# Patient Record
Sex: MALE | Race: OTHER | NOT HISPANIC OR LATINO
[De-identification: names, ages, dates, MRNs, and addresses within clinical notes are randomized per-mention and may not be internally consistent; named-entity substitution may affect disease eponyms.]

---

## 2020-12-16 ENCOUNTER — TRANSCRIPTION ENCOUNTER (OUTPATIENT)
Age: 60
End: 2020-12-16

## 2022-07-19 ENCOUNTER — APPOINTMENT (OUTPATIENT)
Dept: PAIN MANAGEMENT | Facility: CLINIC | Age: 62
End: 2022-07-19

## 2022-07-19 VITALS
HEIGHT: 70 IN | HEART RATE: 78 BPM | WEIGHT: 145 LBS | BODY MASS INDEX: 20.76 KG/M2 | DIASTOLIC BLOOD PRESSURE: 74 MMHG | SYSTOLIC BLOOD PRESSURE: 128 MMHG

## 2022-07-19 DIAGNOSIS — Z78.9 OTHER SPECIFIED HEALTH STATUS: ICD-10-CM

## 2022-07-19 DIAGNOSIS — Z80.9 FAMILY HISTORY OF MALIGNANT NEOPLASM, UNSPECIFIED: ICD-10-CM

## 2022-07-19 DIAGNOSIS — Z82.3 FAMILY HISTORY OF STROKE: ICD-10-CM

## 2022-07-19 DIAGNOSIS — Z96.9 PRESENCE OF FUNCTIONAL IMPLANT, UNSPECIFIED: ICD-10-CM

## 2022-07-19 PROBLEM — Z00.00 ENCOUNTER FOR PREVENTIVE HEALTH EXAMINATION: Status: ACTIVE | Noted: 2022-07-19

## 2022-07-19 LAB
AMPHET UR-MCNC: NORMAL
BARBITURATES UR-MCNC: NORMAL
BENZODIAZ UR-MCNC: NORMAL
COCAINE METAB.OTHER UR-MCNC: NORMAL
DRUG SCREEN, URINE ROUTINE: NORMAL
METHADONE UR-MCNC: NORMAL
METHAQUALONE UR-MCNC: NORMAL
OPIATES UR-MCNC: NORMAL
PCP UR-MCNC: NORMAL
PROPOXYPH UR QL: NORMAL
THC UR QL: POSITIVE

## 2022-07-19 PROCEDURE — 80305 DRUG TEST PRSMV DIR OPT OBS: CPT | Mod: QW

## 2022-07-19 PROCEDURE — 99214 OFFICE O/P EST MOD 30 MIN: CPT

## 2022-07-19 NOTE — DISCUSSION/SUMMARY
[de-identified] : The patient is stable on current pain medication with analgesia and without notable side effects or any obvious aberrant behaviors exhibited. Will renew medication today.\par \par Urine drug screening collected today with rapid sample result consistent with given regimen. Sample to be sent for confirmatory testing with additional relief at a later time.\par \par MMRx high THC low CBD up to QID dosing, prn pain\par \par I personally reviewed with the PA, this patient's history and physical exam findings, as documented above. I have discussed the relevant areas of concern, having direct implications to the presenting problems and illnesses, and I have personally examined all pertinent and positive and negative findings, which impact on the prior pain management treatment. \par \par \par NEO Bustillo, \par \par

## 2022-07-19 NOTE — HISTORY OF PRESENT ILLNESS
[FreeTextEntry1] : A continuing active encounter took place, previous history and exam reviewed.\par \par HISTORY OF PRESENT ILLNESS: Mr. Mcghee is a 60 year old male complaining of chronic lower back pain. He reports he was a . The pain started after lifting weights. The patient has had this pain for 4 years. Patient describes the pain as moderate-severe. During the last month the pain has been nearly constant with symptoms worsening in the afternoon, evening. Pain described as burning, pressure-like, shooting. Pain is associated with pins and needles. Pain is increased with standing. Pain is decreased with lying down, sitting, walking, exercise, relaxation. Pain is not changed with coughing sneezing or bowel movements. Bowel or bladder habits have not changed.\par \par ACTIVITIES: Patient could walk 100 blocks before the pain starts. Patient can sit 2-3 hours before pain starts. Patient can stand 2-3 hours before pain starts. The patient stones on lies down because of pain. Patient uses no assistive walking device at this time. \par \par PRIOR PAIN TREATMENTS: Moderate relief with physical therapy, exercise, spinal cord stimulator\par \par Prior Pain Medications: Hydrocodone, codeine, oxycontin, Tylenol, aspirin, motrin\par \par TODAY: he returns for a revisit encounter. Medical marijuana continues for pain control efforts. The given regimen allows moderate relief with regards to his low back pain and he notes reduced numbness, tingling, burning features emanating from the low back and into the legs. He remains largely content with his reduced pain at this time and he wishes to continue without change.

## 2022-07-19 NOTE — ASSESSMENT
[FreeTextEntry1] : 60yo M who continues with the use of medical marijuana for pain control efforts; relief remains largely stable and he wishes to continue without change at this time\par \par 30 min encounter.

## 2022-07-19 NOTE — REVIEW OF SYSTEMS
[Arthralgia] : arthralgia [Chills] : no chills [Discharge] : no discharge [Nasal Discharge] : no nasal discharge [Cough] : no cough [Constipation] : no constipation [Urinary Urgency] : no urinary urgency [Breast Lump] : no breast lump

## 2022-11-07 ENCOUNTER — NON-APPOINTMENT (OUTPATIENT)
Age: 62
End: 2022-11-07

## 2022-11-23 ENCOUNTER — APPOINTMENT (OUTPATIENT)
Dept: PAIN MANAGEMENT | Facility: CLINIC | Age: 62
End: 2022-11-23

## 2023-02-28 ENCOUNTER — LABORATORY RESULT (OUTPATIENT)
Age: 63
End: 2023-02-28

## 2023-03-01 ENCOUNTER — APPOINTMENT (OUTPATIENT)
Dept: PAIN MANAGEMENT | Facility: CLINIC | Age: 63
End: 2023-03-01
Payer: COMMERCIAL

## 2023-03-01 VITALS — HEIGHT: 70 IN | BODY MASS INDEX: 20.76 KG/M2 | WEIGHT: 145 LBS

## 2023-03-01 LAB
AMP / AMPHETAMINE: NEGATIVE
BAR / SECOBARBITAL: NEGATIVE
BZO / OXAZEPAM: NEGATIVE
CREATININE: NORMAL MG/DL
MDMA / METHYLENEDIOXYMETHAMPHETAMINE: NEGATIVE
MET / METHAMPHETAMINES: NEGATIVE
MOP / MORPHINE: NEGATIVE
MTD / METHADONE: NEGATIVE
OXY / OXYCODONE: NEGATIVE
PCP / PHENCYCLIDINE: NEGATIVE
PH: NORMAL
SPECIFIC GRAVITY: NORMAL
TEMPERATURE: NORMAL C
THC / MARIJUANA: POSITIVE

## 2023-03-01 PROCEDURE — 80305 DRUG TEST PRSMV DIR OPT OBS: CPT | Mod: QW

## 2023-03-01 PROCEDURE — 99214 OFFICE O/P EST MOD 30 MIN: CPT

## 2023-03-01 NOTE — HISTORY OF PRESENT ILLNESS
[FreeTextEntry1] : A continuing active encounter took place, previous history and exam reviewed.\par \par HISTORY OF PRESENT ILLNESS: Mr. Mcghee is a 62 year old male complaining of chronic lower back pain. He reports he was a . The pain started after lifting weights. The patient has had this pain for 4 years. Patient describes the pain as moderate-severe. During the last month the pain has been nearly constant with symptoms worsening in the afternoon, evening. Pain described as burning, pressure-like, shooting. Pain is associated with pins and needles. Pain is increased with standing. Pain is decreased with lying down, sitting, walking, exercise, relaxation. Pain is not changed with coughing sneezing or bowel movements. Bowel or bladder habits have not changed.\par \par ACTIVITIES: Patient could walk 100 blocks before the pain starts. Patient can sit 2-3 hours before pain starts. Patient can stand 2-3 hours before pain starts. The patient stones on lies down because of pain. Patient uses no assistive walking device at this time. \par \par PRIOR PAIN TREATMENTS: Moderate relief with physical therapy, exercise, spinal cord stimulator\par \par Prior Pain Medications: Hydrocodone, codeine, oxycontin, Tylenol, aspirin, motrin\par \par TODAY: He returns for a revisit encounter. Medical marijuana continues for pain control efforts. The given regimen allows moderate relief with regards to his low back pain and he notes reduced numbness, tingling, burning features emanating from the low back and into the legs. He remains largely content with his reduced pain at this time and he wishes to continue without change. Of note, patient participates in yoga 3-4 times a week.\par \par UDS, repeated today, 3/1/23 - see separate note.

## 2023-03-01 NOTE — PHYSICAL EXAM
[de-identified] : Back, including spine: Palpation of the thoracic/lumbar spine is as follows: bilateral lumbar paraspinal spasm and bilateral lumbar paraspinal tenderness. Range of motion of the thoracic and lumbar spine is as follows: Diminished range of motion in all planes. Special testing of the thoracic and lumbar spine is as follows: Positive jung maneuver/facet loading bilaterally.

## 2023-03-01 NOTE — ASSESSMENT
[FreeTextEntry1] : 63yo M who continues with the use of medical marijuana for pain control efforts; relief remains largely stable and he wishes to continue without change at this time. \par MMRx high THC low CBD up to QID dosing, prn pain\par \par

## 2023-03-01 NOTE — REASON FOR VISIT
[Follow-Up Visit] : a follow-up pain management visit [FreeTextEntry2] : Patient presents to office today for a follow up on back pain

## 2023-03-01 NOTE — DISCUSSION/SUMMARY
[de-identified] : The patient is stable on current pain medication with analgesia and without notable side effects or any obvious aberrant behaviors exhibited. Will renew MM today.\par \par Urine drug screening collected today with rapid sample result consistent with given regimen. Sample to be sent for confirmatory testing with additional relief at a later time.\par \par The patient will return to the office in 4 weeks time and is aware to contact me with any question or concerns in the interim.\par \par Amy Mancera PA-C\par Mario Ward DO\par \par \par \par

## 2023-03-06 LAB
PM 6 MAM: NEGATIVE NG/ML
PM 7-AMINO-CLONAZ: NEGATIVE NG/ML
PM ALPHA-HYDROXY-ALPRAZOLAM: NEGATIVE NG/ML
PM ALPHA-HYDROXY-MIDAZOLAM: NEGATIVE NG/ML
PM ALPRAZOLAM: NEGATIVE NG/ML
PM AMOBARBITAL: NEGATIVE NG/ML
PM AMPHETAMINE INTERP: NEGATIVE
PM AMPHETAMINE: NEGATIVE NG/ML
PM BARBURATES INTERP: NEGATIVE
PM BEG: NEGATIVE NG/ML
PM BENZODIAZEPINES INTERP: NEGATIVE
PM BUPRENORPHINE INTERP: NEGATIVE
PM BUPRENORPHINE: NEGATIVE NG/ML
PM BUTALBITAL: NEGATIVE NG/ML
PM CLONAZEPAM: NEGATIVE NG/ML
PM COCAINE INTERP: NEGATIVE
PM COCAINE: NEGATIVE NG/ML
PM CODIENE: NEGATIVE NG/ML
PM COTININE: 300 NG/ML
PM DIAZEPAM: NEGATIVE NG/ML
PM DIHYROCODEINE: NEGATIVE NG/ML
PM EDDP: NEGATIVE NG/ML
PM FENTANYL INTERP: NEGATIVE
PM FENTANYL: NEGATIVE NG/ML
PM FLUNITRAZEPAM: NEGATIVE NG/ML
PM FLURAZEPAM: NEGATIVE NG/ML
PM HYDROCODONE: NEGATIVE NG/ML
PM HYDROMORPHONE: NEGATIVE NG/ML
PM LORAZEPAM: NEGATIVE NG/ML
PM MARIJUANA (DELTA-9-THC): 2429 NG/ML
PM MARIJUANA INTERP: POSITIVE
PM MDA: NEGATIVE NG/ML
PM MDEA: NEGATIVE NG/ML
PM MDMA: NEGATIVE NG/ML
PM MEPERIDINE: NEGATIVE NG/ML
PM METHADONE INTERP: NEGATIVE
PM METHADONE: NEGATIVE NG/ML
PM METHAMPHETAMINE: NEGATIVE NG/ML
PM MIDAZOLAM: NEGATIVE NG/ML
PM MORPHINE: NEGATIVE NG/ML
PM NALOXONE: NEGATIVE NG/ML
PM NALTREXONE: NEGATIVE NG/ML
PM NICOTINE INTERP: POSITIVE
PM NORBUPRENORPHINE: NEGATIVE NG/ML
PM NORDIAZEPAM: NEGATIVE NG/ML
PM NORMEPERIDINE: NEGATIVE NG/ML
PM NOROXYCODONE: NEGATIVE NG/ML
PM OPIOID INTERP: NEGATIVE
PM OXAZEPAM: NEGATIVE NG/ML
PM OXXYCODONE INTERP: NEGATIVE
PM OXYCODONE: NEGATIVE NG/ML
PM OXYMORPHONE: NEGATIVE NG/ML
PM PCP: NEGATIVE NG/ML
PM PHENCYCLIDINE INTERP: NEGATIVE
PM PHENOBARBITAL: NEGATIVE NG/ML
PM PPX: NEGATIVE NG/ML
PM PROPOXYPHENE INTERP: NEGATIVE
PM SECOBARBITAL: NEGATIVE NG/ML
PM SUFENTANIL: NEGATIVE NG/ML
PM TAPENTADOL: NEGATIVE NG/ML
PM TEMAZEPAM: NEGATIVE NG/ML
PM TRAMADOL INTERP: NEGATIVE
PM TRAMADOL: NEGATIVE NG/ML

## 2023-07-07 ENCOUNTER — LABORATORY RESULT (OUTPATIENT)
Age: 63
End: 2023-07-07

## 2023-07-07 ENCOUNTER — APPOINTMENT (OUTPATIENT)
Dept: PAIN MANAGEMENT | Facility: CLINIC | Age: 63
End: 2023-07-07
Payer: COMMERCIAL

## 2023-07-07 VITALS — WEIGHT: 145 LBS | BODY MASS INDEX: 20.76 KG/M2 | HEIGHT: 70 IN

## 2023-07-07 LAB
AMP / AMPHETAMINE: NEGATIVE
BAR / SECOBARBITAL: NEGATIVE
BUP / BUPRENORPHINE: NEGATIVE
BZO / OXAZEPAM: NEGATIVE
COC / COCAINE: NEGATIVE
CREATININE: 60 MG/DL
MDMA / METHYLENEDIOXYMETHAMPHETAMINE: NEGATIVE
MET / METHAMPHETAMINES: NEGATIVE
MOP / MORPHINE: NEGATIVE
MTD / METHADONE: NEGATIVE
OXY / OXYCODONE: NEGATIVE
PCP / PHENCYCLIDINE: NEGATIVE
PH: 5
SPECIFIC GRAVITY: 1.01
TEMPERATURE: 90 C
THC / MARIJUANA: POSITIVE

## 2023-07-07 PROCEDURE — 80305 DRUG TEST PRSMV DIR OPT OBS: CPT | Mod: QW

## 2023-07-07 PROCEDURE — 99213 OFFICE O/P EST LOW 20 MIN: CPT

## 2023-07-07 NOTE — PHYSICAL EXAM
[de-identified] : Back, including spine: Palpation of the thoracic/lumbar spine is as follows: bilateral lumbar paraspinal spasm and bilateral lumbar paraspinal tenderness. Range of motion of the thoracic and lumbar spine is as follows: Diminished range of motion in all planes. Special testing of the thoracic and lumbar spine is as follows: Positive jung maneuver/facet loading bilaterally.

## 2023-07-07 NOTE — DISCUSSION/SUMMARY
[de-identified] : The patient is stable on current pain medication with analgesia and without notable side effects or any obvious aberrant behaviors exhibited.\par Urine drug screening collected today with rapid sample result consistent with given regimen. Sample to be sent for confirmatory testing with additional relief at a later time.\par \par The patient will return to the office in 4 months' time and is aware to contact me with any question or concerns in the interim.\par \par Amy Mancera PA-C\par Mario Ward DO\par \par \par \par

## 2023-07-07 NOTE — HISTORY OF PRESENT ILLNESS
[FreeTextEntry1] : A continuing active encounter took place, previous history and exam reviewed.\par \par HISTORY OF PRESENT ILLNESS: Mr. Mcghee is a 62 year old male complaining of chronic lower back pain. He reports he was a . The pain started after lifting weights. The patient has had this pain for 4 years. Patient describes the pain as moderate-severe. During the last month the pain has been nearly constant with symptoms worsening in the afternoon, evening. Pain described as burning, pressure-like, shooting. Pain is associated with pins and needles. Pain is increased with standing. Pain is decreased with lying down, sitting, walking, exercise, relaxation. Pain is not changed with coughing sneezing or bowel movements. Bowel or bladder habits have not changed.\par \par ACTIVITIES: Patient could walk 100 blocks before the pain starts. Patient can sit 2-3 hours before pain starts. Patient can stand 2-3 hours before pain starts. The patient stones on lies down because of pain. Patient uses no assistive walking device at this time. \par \par PRIOR PAIN TREATMENTS: Moderate relief with physical therapy, exercise, spinal cord stimulator\par \par Prior Pain Medications: Hydrocodone, codeine, oxycontin, Tylenol, aspirin, motrin\par \par TODAY: He returns for a revisit encounter. Medical marijuana continues for pain control efforts. The given regimen allows moderate relief with regards to his low back pain and he notes reduced numbness, tingling, burning features emanating from the low back and into the legs. He utilizes it 3-4 times a day. He remains largely content with his reduced pain at this time and he wishes to continue without change. Of note, patient participates in yoga 3-4 times a week.\par \par UDS, repeated today, 7/7/23 - see separate note.

## 2023-07-14 LAB
PM 6 MAM: NEGATIVE NG/ML
PM 7-AMINO-CLONAZ: NEGATIVE NG/ML
PM ALPHA-HYDROXY-ALPRAZOLAM: NEGATIVE NG/ML
PM ALPHA-HYDROXY-MIDAZOLAM: NEGATIVE NG/ML
PM ALPRAZOLAM: NEGATIVE NG/ML
PM AMOBARBITAL: NEGATIVE NG/ML
PM AMPHETAMINE INTERP: NEGATIVE
PM AMPHETAMINE: NEGATIVE NG/ML
PM BARBURATES INTERP: NEGATIVE
PM BEG: NEGATIVE NG/ML
PM BENZODIAZEPINES INTERP: NEGATIVE
PM BUPRENORPHINE INTERP: NEGATIVE
PM BUPRENORPHINE: NEGATIVE NG/ML
PM BUTALBITAL: NEGATIVE NG/ML
PM CLONAZEPAM: NEGATIVE NG/ML
PM COCAINE INTERP: NEGATIVE
PM COCAINE: NEGATIVE NG/ML
PM CODIENE: NEGATIVE NG/ML
PM COTININE: 156 NG/ML
PM DIAZEPAM: NEGATIVE NG/ML
PM DIHYROCODEINE: NEGATIVE NG/ML
PM EDDP: NEGATIVE NG/ML
PM FENTANYL INTERP: NEGATIVE
PM FENTANYL: NEGATIVE NG/ML
PM FLUNITRAZEPAM: NEGATIVE NG/ML
PM FLURAZEPAM: NEGATIVE NG/ML
PM HYDROCODONE: NEGATIVE NG/ML
PM HYDROMORPHONE: NEGATIVE NG/ML
PM LORAZEPAM: NEGATIVE NG/ML
PM MARIJUANA (DELTA-9-THC): 822 NG/ML
PM MARIJUANA INTERP: POSITIVE
PM MDA: NEGATIVE NG/ML
PM MDEA: NEGATIVE NG/ML
PM MDMA: NEGATIVE NG/ML
PM MEPERIDINE: NEGATIVE NG/ML
PM METHADONE INTERP: NEGATIVE
PM METHADONE: NEGATIVE NG/ML
PM METHAMPHETAMINE: NEGATIVE NG/ML
PM MIDAZOLAM: NEGATIVE NG/ML
PM MORPHINE: NEGATIVE NG/ML
PM NALOXONE: NEGATIVE NG/ML
PM NALTREXONE: NEGATIVE NG/ML
PM NICOTINE INTERP: POSITIVE
PM NORBUPRENORPHINE: NEGATIVE NG/ML
PM NORDIAZEPAM: NEGATIVE NG/ML
PM NORMEPERIDINE: NEGATIVE NG/ML
PM NOROXYCODONE: NEGATIVE NG/ML
PM OPIOID INTERP: NEGATIVE
PM OXAZEPAM: NEGATIVE NG/ML
PM OXXYCODONE INTERP: NEGATIVE
PM OXYCODONE: NEGATIVE NG/ML
PM OXYMORPHONE: NEGATIVE NG/ML
PM PCP: NEGATIVE NG/ML
PM PHENCYCLIDINE INTERP: NEGATIVE
PM PHENOBARBITAL: NEGATIVE NG/ML
PM PPX: NEGATIVE NG/ML
PM PROPOXYPHENE INTERP: NEGATIVE
PM SECOBARBITAL: NEGATIVE NG/ML
PM SUFENTANIL: NEGATIVE NG/ML
PM TAPENTADOL: NEGATIVE NG/ML
PM TEMAZEPAM: NEGATIVE NG/ML
PM TRAMADOL INTERP: NEGATIVE
PM TRAMADOL: NEGATIVE NG/ML

## 2023-11-24 ENCOUNTER — APPOINTMENT (OUTPATIENT)
Dept: PAIN MANAGEMENT | Facility: CLINIC | Age: 63
End: 2023-11-24
Payer: COMMERCIAL

## 2023-11-24 VITALS
WEIGHT: 145 LBS | HEIGHT: 70 IN | HEART RATE: 49 BPM | BODY MASS INDEX: 20.76 KG/M2 | SYSTOLIC BLOOD PRESSURE: 144 MMHG | DIASTOLIC BLOOD PRESSURE: 84 MMHG

## 2023-11-24 PROCEDURE — 99214 OFFICE O/P EST MOD 30 MIN: CPT

## 2024-01-10 ENCOUNTER — APPOINTMENT (OUTPATIENT)
Dept: PAIN MANAGEMENT | Facility: CLINIC | Age: 64
End: 2024-01-10
Payer: COMMERCIAL

## 2024-01-10 VITALS — HEIGHT: 70 IN | WEIGHT: 145 LBS | BODY MASS INDEX: 20.76 KG/M2

## 2024-01-10 PROCEDURE — 99213 OFFICE O/P EST LOW 20 MIN: CPT

## 2024-01-10 NOTE — DISCUSSION/SUMMARY
[de-identified] : 63-year-old male who suffers with low back pain and radicular symptoms. Patient has been having increased left-sided lower back pain. He has been undergoing PT over the past 3 weeks. He will continue with PT. He will call our office at the end of January and let us know how he feels after undergoing at least 6 weeks of PT. If his pain remains persistent, a MRI of the lumbar spine will be re-requested at that time.  NEO Burleson MD

## 2024-01-10 NOTE — PHYSICAL EXAM
[de-identified] : Back, including spine: Palpation of the thoracic/lumbar spine is as follows: bilateral lumbar paraspinal spasm and bilateral lumbar paraspinal tenderness. Range of motion of the thoracic and lumbar spine is as follows: Diminished range of motion in all planes. Special testing of the thoracic and lumbar spine is as follows: Positive jung maneuver/facet loading bilaterally.

## 2024-01-10 NOTE — HISTORY OF PRESENT ILLNESS
[FreeTextEntry1] : A continuing active encounter took place, previous history and exam reviewed.  HISTORY OF PRESENT ILLNESS: Mr. Mcghee is a 63 year old male complaining of chronic lower back pain. He reports he was a . The pain started after lifting weights. The patient has had this pain for 4 years. Patient describes the pain as moderate-severe. During the last month the pain has been nearly constant with symptoms worsening in the afternoon, evening. Pain described as burning, pressure-like, shooting. Pain is associated with pins and needles. Pain is increased with standing. Pain is decreased with lying down, sitting, walking, exercise, relaxation. Pain is not changed with coughing sneezing or bowel movements. Bowel or bladder habits have not changed.  ACTIVITIES: Patient could walk 100 blocks before the pain starts. Patient can sit 2-3 hours before pain starts. Patient can stand 2-3 hours before pain starts. The patient stones on lies down because of pain. Patient uses no assistive walking device at this time.   PRIOR PAIN TREATMENTS: Moderate relief with physical therapy, exercise, spinal cord stimulator  Prior Pain Medications: Hydrocodone, codeine, oxycontin, Tylenol, aspirin, motrin  TODAY: He returns for a revisit encounter. Patient continues to suffer with low back pain and occasional numbness, tingling, burning features emanating from the low back and into the legs. On his last visit, patient complained of increased left sided back pain. A MRI was ordered, but denied by his insurance carrier due to lack of PT. Patient was advised about this and started doing PT. He has been undergoing it for 3 weeks now. He states the PT is helping, but he continues to have persistent left-sided back pain.  He continues to utilize medical marijuana. He is in the process of finding a different pain specialist who will be able to refill the medical marijuana for him.

## 2024-02-29 ENCOUNTER — APPOINTMENT (OUTPATIENT)
Dept: PAIN MANAGEMENT | Facility: CLINIC | Age: 64
End: 2024-02-29
Payer: COMMERCIAL

## 2024-02-29 PROCEDURE — 99214 OFFICE O/P EST MOD 30 MIN: CPT

## 2024-02-29 NOTE — PHYSICAL EXAM
[de-identified] : Back, including spine: Palpation of the thoracic/lumbar spine is as follows: bilateral lumbar paraspinal spasm and bilateral lumbar paraspinal tenderness. Range of motion of the thoracic and lumbar spine is as follows: Diminished range of motion in all planes. Special testing of the thoracic and lumbar spine is as follows: Positive jung maneuver/facet loading bilaterally.

## 2024-02-29 NOTE — HISTORY OF PRESENT ILLNESS
[FreeTextEntry1] : A continuing active encounter took place, previous history and exam reviewed.  HISTORY OF PRESENT ILLNESS: Mr. Mcghee is a 63 year old male complaining of chronic lower back pain. He reports he was a . The pain started after lifting weights. The patient has had this pain for 4 years. Patient describes the pain as moderate-severe. During the last month the pain has been nearly constant with symptoms worsening in the afternoon, evening. Pain described as burning, pressure-like, shooting. Pain is associated with pins and needles. Pain is increased with standing. Pain is decreased with lying down, sitting, walking, exercise, relaxation. Pain is not changed with coughing sneezing or bowel movements. Bowel or bladder habits have not changed.  ACTIVITIES: Patient could walk 100 blocks before the pain starts. Patient can sit 2-3 hours before pain starts. Patient can stand 2-3 hours before pain starts. The patient stones on lies down because of pain. Patient uses no assistive walking device at this time.   PRIOR PAIN TREATMENTS: Moderate relief with physical therapy, exercise, spinal cord stimulator  Prior Pain Medications: Hydrocodone, codeine, oxycontin, Tylenol, aspirin, motrin  TODAY: He returns for a revisit encounter. Patient continues to suffer with low back pain and occasional numbness, tingling, burning features emanating from the low back and into the legs. Patient has been complaining of persistent left sided back pain. A MRI was ordered, but denied by his insurance carrier due to lack of PT. Pt started his PT sessions and has been undergoing them for 6 weeks now. He has been attending them 2-3 times as well as doing his own home exercises. He states the PT has been helping slightly, but he continues to have persistent left-sided back pain.  He continues to utilize medical marijuana. As our practice no longer offers medical marijuana, patient was told to see another pain specialist for it.

## 2024-02-29 NOTE — ASSESSMENT
[FreeTextEntry1] : 63-year-old male who suffers with low back pain and radicular symptoms. Patient has been having increased left-sided lower back pain. He has been undergoing PT over the past 6 weeks, 2-3 times a week as well as doing home exercises. His pain has improved slightly but remains persistent, therefore we will re-request a lumbar MRI without contrast to delineate spine pathologies such as disc displacement and stenosis. RTO in 4 weeks after obtaining the MRI.  NEO Burleson MD

## 2024-03-21 ENCOUNTER — APPOINTMENT (OUTPATIENT)
Dept: PAIN MANAGEMENT | Facility: CLINIC | Age: 64
End: 2024-03-21
Payer: COMMERCIAL

## 2024-03-21 DIAGNOSIS — M47.816 SPONDYLOSIS W/OUT MYELOPATHY OR RADICULOPATHY, LUMBAR REGION: ICD-10-CM

## 2024-03-21 DIAGNOSIS — G89.4 CHRONIC PAIN SYNDROME: ICD-10-CM

## 2024-03-21 DIAGNOSIS — M54.16 RADICULOPATHY, LUMBAR REGION: ICD-10-CM

## 2024-03-21 PROCEDURE — 99214 OFFICE O/P EST MOD 30 MIN: CPT

## 2024-03-21 NOTE — ASSESSMENT
[FreeTextEntry1] : 63-year-old male who suffers with low back pain and radicular symptoms. He suffers with persistent left-sided lower back pain. He has been undergoing PT as well as doing home exercises. His pain has improved. He is aware of his lumbar MRI findings. Pt defers injection therapy at this time.  RTO in 6 months for reevaluation.   NEO Burleson MD

## 2024-03-21 NOTE — REVIEW OF SYSTEMS
[Arthralgia] : arthralgia [Chills] : no chills [Discharge] : no discharge [Nasal Discharge] : no nasal discharge [Cough] : no cough [Urinary Urgency] : no urinary urgency [Constipation] : no constipation [Breast Lump] : no breast lump

## 2024-03-21 NOTE — PHYSICAL EXAM
[de-identified] : Back, including spine: Palpation of the thoracic/lumbar spine is as follows: bilateral lumbar paraspinal spasm and bilateral lumbar paraspinal tenderness. Range of motion of the thoracic and lumbar spine is as follows: Diminished range of motion in all planes. Special testing of the thoracic and lumbar spine is as follows: Positive jung maneuver/facet loading bilaterally.

## 2024-03-21 NOTE — HISTORY OF PRESENT ILLNESS
[FreeTextEntry1] : A continuing active encounter took place, previous history and exam reviewed.  HISTORY OF PRESENT ILLNESS: Mr. Mcghee is a 63 year old male complaining of chronic lower back pain. He reports he was a . The pain started after lifting weights. The patient has had this pain for 4 years. Patient describes the pain as moderate-severe. During the last month the pain has been nearly constant with symptoms worsening in the afternoon, evening. Pain described as burning, pressure-like, shooting. Pain is associated with pins and needles. Pain is increased with standing. Pain is decreased with lying down, sitting, walking, exercise, relaxation. Pain is not changed with coughing sneezing or bowel movements. Bowel or bladder habits have not changed.  ACTIVITIES: Patient could walk 100 blocks before the pain starts. Patient can sit 2-3 hours before pain starts. Patient can stand 2-3 hours before pain starts. The patient stones on lies down because of pain. Patient uses no assistive walking device at this time.   PRIOR PAIN TREATMENTS: Moderate relief with physical therapy, exercise, spinal cord stimulator  Prior Pain Medications: Hydrocodone, codeine, oxycontin, Tylenol, aspirin, motrin  TODAY:  Pt is a very pleasant 63 year old male who is here today for a follow up, he has no new complaints or acute changes.  Patient continues to suffer with low back pain and occasional numbness, tingling, burning features emanating from the low back down to his knee, L>R. Patient mostly has persistent left sided back pain. He is undergoing PT which has provided him with excellent relief, he also does his own home exercises.   A MRI of the lumbar spine was obtained on 3/15/24, which was reviewed today. There is a central L4-5 disc protrusion. Bilateral foraminal disc extension. Marked left L4-5 facet hypertrophy. Central L5-S1 disc protrusion. Moderate/marked bilateral facet hypertrophy. Bilateral foraminal disc extension abuts the L5 nerve roots.  Today, we discussed injection therapy which he defers at this time. He states he would like to continue with PT and his own exercises for now.

## 2024-03-21 NOTE — PHYSICAL EXAM
[de-identified] : Back, including spine: Palpation of the thoracic/lumbar spine is as follows: bilateral lumbar paraspinal spasm and bilateral lumbar paraspinal tenderness. Range of motion of the thoracic and lumbar spine is as follows: Diminished range of motion in all planes. Special testing of the thoracic and lumbar spine is as follows: Positive jung maneuver/facet loading bilaterally.

## 2024-09-18 ENCOUNTER — APPOINTMENT (OUTPATIENT)
Dept: PAIN MANAGEMENT | Facility: CLINIC | Age: 64
End: 2024-09-18

## 2024-10-16 ENCOUNTER — APPOINTMENT (OUTPATIENT)
Dept: CARDIOLOGY | Facility: CLINIC | Age: 64
End: 2024-10-16
Payer: COMMERCIAL

## 2024-10-16 VITALS
HEART RATE: 50 BPM | DIASTOLIC BLOOD PRESSURE: 70 MMHG | HEIGHT: 70 IN | SYSTOLIC BLOOD PRESSURE: 112 MMHG | WEIGHT: 143 LBS | BODY MASS INDEX: 20.47 KG/M2

## 2024-10-16 DIAGNOSIS — Z87.891 PERSONAL HISTORY OF NICOTINE DEPENDENCE: ICD-10-CM

## 2024-10-16 DIAGNOSIS — R94.31 ABNORMAL ELECTROCARDIOGRAM [ECG] [EKG]: ICD-10-CM

## 2024-10-16 DIAGNOSIS — I25.10 ATHEROSCLEROTIC HEART DISEASE OF NATIVE CORONARY ARTERY W/OUT ANGINA PECTORIS: ICD-10-CM

## 2024-10-16 DIAGNOSIS — E78.5 HYPERLIPIDEMIA, UNSPECIFIED: ICD-10-CM

## 2024-10-16 PROCEDURE — 99204 OFFICE O/P NEW MOD 45 MIN: CPT | Mod: 25

## 2024-10-16 PROCEDURE — 93000 ELECTROCARDIOGRAM COMPLETE: CPT

## 2024-10-16 RX ORDER — SILDENAFIL CITRATE 50 MG/1
50 TABLET, FILM COATED ORAL
Refills: 0 | Status: ACTIVE | COMMUNITY

## 2025-01-16 ENCOUNTER — APPOINTMENT (OUTPATIENT)
Dept: CARDIOLOGY | Facility: CLINIC | Age: 65
End: 2025-01-16
Payer: COMMERCIAL

## 2025-01-16 PROCEDURE — 93306 TTE W/DOPPLER COMPLETE: CPT

## 2025-01-16 PROCEDURE — 93880 EXTRACRANIAL BILAT STUDY: CPT

## 2025-01-24 ENCOUNTER — APPOINTMENT (OUTPATIENT)
Dept: CARDIOLOGY | Facility: CLINIC | Age: 65
End: 2025-01-24
Payer: COMMERCIAL

## 2025-01-24 ENCOUNTER — NON-APPOINTMENT (OUTPATIENT)
Age: 65
End: 2025-01-24

## 2025-01-24 VITALS
HEART RATE: 59 BPM | DIASTOLIC BLOOD PRESSURE: 70 MMHG | BODY MASS INDEX: 21.76 KG/M2 | HEIGHT: 70 IN | WEIGHT: 152 LBS | SYSTOLIC BLOOD PRESSURE: 110 MMHG

## 2025-01-24 DIAGNOSIS — I25.10 ATHEROSCLEROTIC HEART DISEASE OF NATIVE CORONARY ARTERY W/OUT ANGINA PECTORIS: ICD-10-CM

## 2025-01-24 DIAGNOSIS — I34.0 NONRHEUMATIC MITRAL (VALVE) INSUFFICIENCY: ICD-10-CM

## 2025-01-24 DIAGNOSIS — Z71.82 EXERCISE COUNSELING: ICD-10-CM

## 2025-01-24 DIAGNOSIS — Z87.891 PERSONAL HISTORY OF NICOTINE DEPENDENCE: ICD-10-CM

## 2025-01-24 DIAGNOSIS — E78.5 HYPERLIPIDEMIA, UNSPECIFIED: ICD-10-CM

## 2025-01-24 DIAGNOSIS — Z71.89 OTHER SPECIFIED COUNSELING: ICD-10-CM

## 2025-01-24 DIAGNOSIS — Z71.3 DIETARY COUNSELING AND SURVEILLANCE: ICD-10-CM

## 2025-01-24 PROCEDURE — 99214 OFFICE O/P EST MOD 30 MIN: CPT

## 2025-01-24 PROCEDURE — 93000 ELECTROCARDIOGRAM COMPLETE: CPT

## 2025-01-24 PROCEDURE — G2211 COMPLEX E/M VISIT ADD ON: CPT | Mod: NC

## 2025-07-25 ENCOUNTER — APPOINTMENT (OUTPATIENT)
Dept: CARDIOLOGY | Facility: CLINIC | Age: 65
End: 2025-07-25
Payer: COMMERCIAL

## 2025-07-25 VITALS
WEIGHT: 147 LBS | SYSTOLIC BLOOD PRESSURE: 134 MMHG | HEIGHT: 70 IN | HEART RATE: 49 BPM | DIASTOLIC BLOOD PRESSURE: 80 MMHG | BODY MASS INDEX: 21.05 KG/M2

## 2025-07-25 DIAGNOSIS — Z71.89 OTHER SPECIFIED COUNSELING: ICD-10-CM

## 2025-07-25 DIAGNOSIS — I25.10 ATHEROSCLEROTIC HEART DISEASE OF NATIVE CORONARY ARTERY W/OUT ANGINA PECTORIS: ICD-10-CM

## 2025-07-25 DIAGNOSIS — I34.0 NONRHEUMATIC MITRAL (VALVE) INSUFFICIENCY: ICD-10-CM

## 2025-07-25 DIAGNOSIS — Z87.891 PERSONAL HISTORY OF NICOTINE DEPENDENCE: ICD-10-CM

## 2025-07-25 DIAGNOSIS — Z71.82 EXERCISE COUNSELING: ICD-10-CM

## 2025-07-25 DIAGNOSIS — Z71.3 DIETARY COUNSELING AND SURVEILLANCE: ICD-10-CM

## 2025-07-25 DIAGNOSIS — E78.5 HYPERLIPIDEMIA, UNSPECIFIED: ICD-10-CM

## 2025-07-25 PROCEDURE — 93000 ELECTROCARDIOGRAM COMPLETE: CPT

## 2025-07-25 PROCEDURE — G2211 COMPLEX E/M VISIT ADD ON: CPT | Mod: NC

## 2025-07-25 PROCEDURE — 99214 OFFICE O/P EST MOD 30 MIN: CPT

## 2025-07-25 RX ORDER — TAMSULOSIN HYDROCHLORIDE 0.4 MG/1
0.4 CAPSULE ORAL DAILY
Refills: 0 | Status: ACTIVE | COMMUNITY